# Patient Record
Sex: FEMALE | Race: WHITE | NOT HISPANIC OR LATINO | Employment: OTHER | URBAN - METROPOLITAN AREA
[De-identification: names, ages, dates, MRNs, and addresses within clinical notes are randomized per-mention and may not be internally consistent; named-entity substitution may affect disease eponyms.]

---

## 2023-11-01 ENCOUNTER — OFFICE VISIT (OUTPATIENT)
Dept: NEUROLOGY | Facility: CLINIC | Age: 68
End: 2023-11-01
Payer: MEDICARE

## 2023-11-01 VITALS — HEART RATE: 84 BPM | SYSTOLIC BLOOD PRESSURE: 132 MMHG | DIASTOLIC BLOOD PRESSURE: 88 MMHG

## 2023-11-01 DIAGNOSIS — G47.33 OBSTRUCTIVE SLEEP APNEA: Primary | ICD-10-CM

## 2023-11-01 DIAGNOSIS — G47.33 OBSTRUCTIVE SLEEP APNEA: ICD-10-CM

## 2023-11-01 DIAGNOSIS — M54.2 NECK PAIN: Primary | ICD-10-CM

## 2023-11-01 PROCEDURE — 99215 OFFICE O/P EST HI 40 MIN: CPT | Performed by: PSYCHIATRY & NEUROLOGY

## 2023-11-01 RX ORDER — PRAVASTATIN SODIUM 20 MG/1
20 TABLET ORAL
COMMUNITY
Start: 2023-06-08

## 2023-11-01 RX ORDER — PANTOPRAZOLE SODIUM 40 MG/1
40 TABLET, DELAYED RELEASE ORAL DAILY
COMMUNITY

## 2023-11-01 RX ORDER — BIOTIN 10 MG
TABLET ORAL
COMMUNITY
Start: 2021-07-09

## 2023-11-01 RX ORDER — METHYLPREDNISOLONE 4 MG/1
TABLET ORAL
Qty: 21 TABLET | Refills: 0 | Status: SHIPPED | OUTPATIENT
Start: 2023-11-01 | End: 2023-11-08

## 2023-11-01 RX ORDER — ONDANSETRON 4 MG/1
1 TABLET, ORALLY DISINTEGRATING ORAL EVERY 6 HOURS PRN
COMMUNITY
Start: 2023-03-06

## 2023-11-01 RX ORDER — DICYCLOMINE HYDROCHLORIDE 20 MG/1
1 TABLET ORAL DAILY PRN
COMMUNITY
Start: 2023-06-08

## 2023-11-01 RX ORDER — CITALOPRAM 40 MG/1
20 TABLET, FILM COATED ORAL
COMMUNITY
Start: 2015-12-09

## 2023-11-01 RX ORDER — ACETAMINOPHEN, DEXTROMETHORPHAN HBR, DOXYLAMINE SUCCINATE, PHENYLEPHRINE HCL 650; 20; 12.5; 1 MG/30ML; MG/30ML; MG/30ML; MG/30ML
SOLUTION ORAL
COMMUNITY
Start: 2021-07-09

## 2023-11-01 RX ORDER — ACETAMINOPHEN 500 MG
TABLET ORAL
COMMUNITY
Start: 2021-04-09

## 2023-11-01 RX ORDER — LORAZEPAM 0.5 MG/1
0.5 TABLET ORAL NIGHTLY PRN
COMMUNITY

## 2023-11-01 RX ORDER — FLUTICASONE PROPIONATE 50 MCG
1 SPRAY, SUSPENSION (ML) NASAL
COMMUNITY
Start: 2023-06-08

## 2023-11-01 RX ORDER — TIZANIDINE 2 MG/1
2 TABLET ORAL AS NEEDED
COMMUNITY
Start: 2023-08-22

## 2023-11-01 RX ORDER — BIOTIN 10 MG
TABLET ORAL EVERY 24 HOURS
COMMUNITY
End: 2023-11-04 | Stop reason: SDUPTHER

## 2023-11-01 NOTE — PROGRESS NOTES
Chief complaint:        History of Present Illness:       There were no vitals taken for this visit.     Physical examination:      Neurologic Exam       No diagnosis found.       No orders of the defined types were placed in this encounter.         Impression and Plan:      Alissa Osman MD

## 2023-11-01 NOTE — PROGRESS NOTES
Chief complaint:        History of Present Illness:   Florina feels terrible. She is tired and sleeps 16 hours a day. She continues to feel like there is pressure in her head. Her ears feel under pressure and blocked. Her hearing may be muffled. She has seen ENT multiple times and they concluded that she may have Eustachian tube problems. She hears swishing of her blood in her head.     When she walks in a store she may feel woozy and her vision may start blacking out, and she has to lean on the wall. Her cardiologist said that her blood pressure is fine. He ordered a carotid ultrasound, but not for several months.     She had  a sleep study at [facility] and does not think she slept much. She was diagnosed with obstructive sleep apnea and started CPAP. She has been using it but is not sure it makes her feel any better. She thinks it may make her head hurt more by morning.       Physical examination:  She is a pleasant, healthy-appearing woman. Her neck was supple with tenderness on the left side.  Neurologic Exam     Mental Status   Her speech was clear, fluent and appropriate.      Cranial Nerves     CN II   Visual fields full to confrontation.     CN III, IV, VI   Extraocular motions are normal.     CN V   Facial sensation intact.     CN VII   Facial expression full, symmetric.     Motor Exam   Overall muscle tone: normal    Strength   Right deltoid: 5/5  Left deltoid: 5/5  Right biceps: 5/5  Right iliopsoas: 5/5  Left iliopsoas: 5/5    Sensory Exam   Light touch normal.     Gait, Coordination, and Reflexes     Reflexes   Right brachioradialis: 2+  Left brachioradialis: 2+  Right biceps: 2+  Left biceps: 2+  Right triceps: 2+  Left triceps: 2+  Right patellar: 2+  Left patellar: 2+  Right achilles: 1+  Left achilles: 2+  Right plantar: normal  Left plantar: normal  Her gait was narrow-based with a hint of circumduction of the right leg. She could walk on her heels or toes and perform a tandem gait  cautiously.          No diagnosis found.     1. Neck pain  methylPREDNISolone (Medrol Dospak) 4 mg tablets      2. Obstructive sleep apnea           No orders of the defined types were placed in this encounter.         Impression and Plan:    Florina bacon to have a constant feeling of pressure in her head and ears, possibly associated with chronic neck pain, as well as fatigue despite sleeping 16 hours a day. She has tried using CPAP for obstructive sleep apnea with unclear benefit. We will have her consult a neurologist who specializes in sleep disorders. She will also take a Medrol dosepak for her neck pain. She will call  in a week with an update after taking Medrol. Follow up will then be determined.   Alissa Osman MD

## 2023-11-01 NOTE — PATIENT INSTRUCTIONS
You continue to have a feeling of constant pressure in your head and ears, neck pain and fatigue. You have had trouble using CPAP so we will have you consult a neurologist who specializes in sleep disorders. Please also take a Medrol dosepak, taking all pills for the day in the morning. Call me next week and let me know if it helped. You can stop it if you get major side effects and let me know. Please also call Dr. Villegas's office and see if you can get a carotid ultrasound earlier. Call me if you are not able to move up the date. We will then decide about further evaluation.

## 2023-11-04 PROBLEM — R73.09 ELEVATED GLUCOSE: Status: ACTIVE | Noted: 2018-04-15

## 2023-11-04 PROBLEM — F41.9 ANXIETY: Status: ACTIVE | Noted: 2023-11-04

## 2023-11-04 PROBLEM — D35.2 PROLACTINOMA (MULTI): Status: ACTIVE | Noted: 2023-11-04

## 2023-11-04 PROBLEM — G43.109 CLASSICAL MIGRAINE WITHOUT INTRACTABLE MIGRAINE: Status: ACTIVE | Noted: 2023-11-04

## 2023-11-04 PROBLEM — R51.9 INTRACTABLE HEADACHE: Status: ACTIVE | Noted: 2023-11-04

## 2023-11-04 PROBLEM — D49.7 PITUITARY TUMOR: Status: ACTIVE | Noted: 2023-11-04

## 2023-11-04 PROBLEM — C54.1: Status: ACTIVE | Noted: 2023-11-04

## 2023-11-04 PROBLEM — K92.1 HEMATOCHEZIA: Status: ACTIVE | Noted: 2023-11-04

## 2023-11-04 PROBLEM — H93.A1 SUBJECTIVE PULSATILE TINNITUS OF RIGHT EAR: Status: ACTIVE | Noted: 2023-11-04

## 2023-11-04 PROBLEM — Z86.0100 HISTORY OF COLON POLYPS: Status: ACTIVE | Noted: 2023-11-04

## 2023-11-04 PROBLEM — E88.819 INSULIN RESISTANCE: Status: ACTIVE | Noted: 2023-11-04

## 2023-11-04 PROBLEM — E55.9 VITAMIN D DEFICIENCY: Status: ACTIVE | Noted: 2023-11-04

## 2023-11-04 PROBLEM — N95.0 POSTMENOPAUSAL BLEEDING: Status: ACTIVE | Noted: 2023-11-04

## 2023-11-04 PROBLEM — H60.90 OTITIS EXTERNA: Status: ACTIVE | Noted: 2023-11-04

## 2023-11-04 PROBLEM — R51.9 RIGHT-SIDED HEADACHE: Status: ACTIVE | Noted: 2023-11-04

## 2023-11-04 PROBLEM — G47.30 SLEEP APNEA: Status: ACTIVE | Noted: 2023-11-04

## 2023-11-04 PROBLEM — M47.812 CERVICAL ARTHRITIS: Status: ACTIVE | Noted: 2023-09-19

## 2023-11-04 PROBLEM — G47.19 EXCESSIVE DAYTIME SLEEPINESS: Status: ACTIVE | Noted: 2023-11-04

## 2023-11-04 PROBLEM — K21.00 GERD WITH ESOPHAGITIS: Status: ACTIVE | Noted: 2023-11-04

## 2023-11-04 PROBLEM — M54.16 LUMBAR RADICULOPATHY: Status: ACTIVE | Noted: 2023-11-04

## 2023-11-04 PROBLEM — E78.5 HYPERLIPIDEMIA: Status: ACTIVE | Noted: 2018-04-15

## 2023-11-04 PROBLEM — Z86.010 HISTORY OF COLON POLYPS: Status: ACTIVE | Noted: 2023-11-04

## 2023-11-04 PROBLEM — K22.70 BARRETT'S ESOPHAGUS WITHOUT DYSPLASIA: Status: ACTIVE | Noted: 2018-04-15

## 2023-11-04 PROBLEM — H90.3 SENSORINEURAL HEARING LOSS (SNHL) OF BOTH EARS: Status: ACTIVE | Noted: 2023-11-04

## 2023-11-04 PROBLEM — M85.80 OSTEOPENIA: Status: ACTIVE | Noted: 2023-11-04

## 2023-11-04 PROBLEM — K58.0 IRRITABLE BOWEL SYNDROME WITH DIARRHEA: Status: ACTIVE | Noted: 2023-11-04

## 2023-11-04 PROBLEM — J30.9 ALLERGIC RHINITIS: Status: ACTIVE | Noted: 2023-11-04

## 2023-11-04 PROBLEM — R79.89 ABNORMAL C-REACTIVE PROTEIN: Status: ACTIVE | Noted: 2023-11-04

## 2023-11-04 PROBLEM — E53.8 VITAMIN B12 DEFICIENCY: Status: ACTIVE | Noted: 2021-11-04

## 2023-11-04 PROBLEM — C55 UTERINE CANCER (MULTI): Status: ACTIVE | Noted: 2023-11-04

## 2023-11-04 PROBLEM — R00.2 PALPITATIONS: Status: ACTIVE | Noted: 2023-11-04

## 2023-11-04 PROBLEM — G93.32 CHRONIC FATIGUE SYNDROME: Status: ACTIVE | Noted: 2023-11-04

## 2023-11-04 PROBLEM — N95.1 FEMALE CLIMACTERIC STATE: Status: ACTIVE | Noted: 2023-11-04

## 2023-11-04 PROBLEM — K76.0 HEPATIC STEATOSIS: Status: ACTIVE | Noted: 2023-11-04

## 2023-11-04 PROBLEM — G44.229 CHRONIC TENSION-TYPE HEADACHE, NOT INTRACTABLE: Status: ACTIVE | Noted: 2023-11-04

## 2023-11-04 RX ORDER — TRAZODONE HYDROCHLORIDE 50 MG/1
50 TABLET ORAL AS NEEDED
COMMUNITY

## 2023-11-06 ENCOUNTER — OFFICE VISIT (OUTPATIENT)
Dept: SLEEP MEDICINE | Facility: CLINIC | Age: 68
End: 2023-11-06
Payer: MEDICARE

## 2023-11-06 VITALS
HEIGHT: 62 IN | BODY MASS INDEX: 29.08 KG/M2 | WEIGHT: 158 LBS | DIASTOLIC BLOOD PRESSURE: 78 MMHG | SYSTOLIC BLOOD PRESSURE: 138 MMHG | HEART RATE: 96 BPM | OXYGEN SATURATION: 96 %

## 2023-11-06 DIAGNOSIS — J30.9 ALLERGIC RHINITIS, UNSPECIFIED SEASONALITY, UNSPECIFIED TRIGGER: ICD-10-CM

## 2023-11-06 DIAGNOSIS — G47.33 OSA (OBSTRUCTIVE SLEEP APNEA): Primary | ICD-10-CM

## 2023-11-06 DIAGNOSIS — G47.19 EXCESSIVE DAYTIME SLEEPINESS: ICD-10-CM

## 2023-11-06 DIAGNOSIS — G25.81 RLS (RESTLESS LEGS SYNDROME): ICD-10-CM

## 2023-11-06 PROCEDURE — 1159F MED LIST DOCD IN RCRD: CPT | Performed by: PSYCHIATRY & NEUROLOGY

## 2023-11-06 PROCEDURE — 1160F RVW MEDS BY RX/DR IN RCRD: CPT | Performed by: PSYCHIATRY & NEUROLOGY

## 2023-11-06 PROCEDURE — 99205 OFFICE O/P NEW HI 60 MIN: CPT | Performed by: PSYCHIATRY & NEUROLOGY

## 2023-11-06 PROCEDURE — 1036F TOBACCO NON-USER: CPT | Performed by: PSYCHIATRY & NEUROLOGY

## 2023-11-06 RX ORDER — AZELASTINE 1 MG/ML
1 SPRAY, METERED NASAL 2 TIMES DAILY
Qty: 30 ML | Refills: 1 | Status: SHIPPED | OUTPATIENT
Start: 2023-11-06 | End: 2024-11-05

## 2023-11-06 NOTE — PROGRESS NOTES
Patient: Florina Miramontes    [id number]  : 1955 -- AGE 68 y.o.    Provider: Alvarado Velez MD     [facility]   Service Date: 2023              [facility]  New Visit Note        The patient's referring provider is: Dr. Alissa Osman    HPI: Florina Miramontes is a 68 y.o. female with NELDA on CPAP with PMH notable for allergic rhinitis, HLD, palpitations, insulin resistance, prolactinoma, vitamin B deficiency, vitamin D deficiency, GERD with esophagitis, IBS, uterine cancer, possible eustachian tube dysfunction, chronic tension-type headache, migraine, chronic fatigue syndrome, and excessive daytime sleepiness, who presents today as a referral from her neurologist for evaluation of her excessive daytime sleepiness and non-restorative sleep despite CPAP.     Patient is accompanied today by her .    Patient's main complaint is difficulty waking up about how long she has slept and experiencing excessive daytime sleepiness. For 8-9 years she has felt excessively sleepy and that her sleep is non-restorative.     She endorses occasional snoring that is moderate in severity, no witnessed apneas or waking up gasping/choking.  She does not have night sweats nor morning sore throat, but has nocturia, nocturnal heartburn, bruxism, morning dry mouth, and morning headaches.      She has experienced dreaming as she falls asleep.  Denies sleep paralysis, sleep-related hallucinations, cataplexy, sleepwalking, and dream enactment behavior.      Started on CPAP in early October. Did not want to try CPAP before this.    Has an issue with her ears feeling full and blocked. When using CPAP she wakes with her head pounding and her head feeling full, which prevents her from being fully compliant with CPAP. Used an under the nose style nasal mask that was comfortable enough, did not like nasal pillows due to nasal irritation. Once turned on humidity function and felt she was  "drowning in water in her sleep.    Feels she is dreaming all night long, but rarely when napping.    RLS - bothersome urge to move her legs at night, relieved with ambulation, occurs about once/month now, was more frequent as a younger adult. Symptoms can affect her ability to fall asleep.    Also endorses head pressure/throbbing/lightheadedness and neck stiffness.    Has years of allergic rhinitis due to environmental factors, including her 10 year old dog. Sometimes uses her Flonase, but it does not help much. Further questioning yielded the information that she is spraying it directly up her nostrils and swallowing most of it rather than spraying it towards the lateral walls of her nostrils. Thinks she may have azelastine spray at home, but requests a prescription just in case.    DAYTIME SYMPTOMS  [place]: 9 (1, 1, 2, 2, 3, 0, 0, 0)  Daytime sleepiness: Frequent - \"slept most of the summer away\" and her mood was low  Fatigue: Yes  Trouble with memory/concentration: Yes  Dozing: no  Feeling sleepy while driving: no    Insomnia severity index: 14/28  FOSQ: 15    SLEEP HABITS:   Preferred sleep position: lateral  Trazodone 50 mg - tried it twice - did not help  Lorazepam 0.5 mg - taking half a tab about twice per month to help her sleep, wakes up feeling better  Bedtime: 8 pm, sleep latency 2 hours  Wake time: 7 am  Napping: around 1 or 2 pm for 2-3 hours almost daily. Napping is not refreshing  Total estimated sleep per 24 hrs: 12 hours    PRIOR SLEEP STUDIES:  HST through  on 8/16/2021, weight 152 pounds, BMI 27.8: Performed due to patient's complaints of snoring, excessive daytime sleepiness, and waking up coughing/clearing throat.  Study estimated sleep efficiency of 94.3% and showed mild-moderate NELDA.  AHI3% 17/h, AHI4% 6.5/h, mean SpO2 93.1%, tru 83.9%, 0.1 minutes spent at or below 88%.    She also reportedly had a sleep study done at [place] through [facility] in 2022. Patient states she slept " for about 1.5 hours, did not fall asleep until 4 AM, was told she has moderate to severe NELDA, and that she had 22 arousals/h. Patient states that she was told that she did not any PLMS.        Patient Active Problem List   Diagnosis    Anxiety    Allergic rhinitis    Aguayo's esophagus without dysplasia    Chronic tension-type headache, not intractable    Cervical arthritis    Classical migraine without intractable migraine    Elevated glucose    Endometrial/uterine adenocarcinoma (CMS/HCC)    Excessive daytime sleepiness    Chronic fatigue syndrome    Female climacteric state    GERD with esophagitis    Hematochezia    Hepatic steatosis    History of colon polyps    Hyperlipidemia    Insulin resistance    Irritable bowel syndrome with diarrhea    Intractable headache    Lumbar radiculopathy    Neck pain    Osteopenia    Palpitations    Otitis externa    Pituitary tumor    Postmenopausal bleeding    Prolactinoma (CMS/HCC)    Right-sided headache    Sleep apnea    Subjective pulsatile tinnitus of right ear    Uterine cancer (CMS/HCC)    Vitamin B12 deficiency    Vitamin D deficiency    Abnormal C-reactive protein    Sensorineural hearing loss (SNHL) of both ears     Past Medical History:   Diagnosis Date    Personal history of malignant neoplasm, unspecified     History of malignant neoplasm     Past Surgical History:   Procedure Laterality Date     SECTION, CLASSIC  2015     Section    MR HEAD ANGIO WO IV CONTRAST  2019    MR HEAD ANGIO WO IV CONTRAST 2019 [facility]    OTHER SURGICAL HISTORY  10/04/2022    Arm surgery    OTHER SURGICAL HISTORY  2019    Hysterectomy    SMALL INTESTINE SURGERY  2015    Small Bowel Resection     Current Outpatient Medications   Medication Sig Dispense Refill    ascorbic acid (Vitamin C) 100 mg tablet       biotin 10 mg tablet Take by mouth.      cholecalciferol (Vitamin D-3) 50 mcg (2,000 unit) capsule Take by mouth.       citalopram (CeleXA) 40 mg tablet Take 0.5 tablets (20 mg) by mouth once daily.      cyanocobalamin, vitamin B-12, (Vitamin B-12) 1,000 mcg tablet extended release Take by mouth.      dicyclomine (Bentyl) 20 mg tablet Take 1 tablet (20 mg) by mouth once daily as needed.      fluticasone (Flonase) 50 mcg/actuation nasal spray 1 spray by Does not apply route once daily.      LORazepam (Ativan) 0.5 mg tablet Take 1 tablet (0.5 mg) by mouth as needed at bedtime.      methylPREDNISolone (Medrol Dospak) 4 mg tablets Follow schedule on package instructions 21 tablet 0    ondansetron ODT (Zofran-ODT) 4 mg disintegrating tablet Take 1 tablet (4 mg) by mouth every 6 hours if needed.      pantoprazole (ProtoNix) 40 mg EC tablet Take 1 tablet (40 mg) by mouth once daily.      pravastatin (Pravachol) 20 mg tablet Take 1 tablet (20 mg) by mouth once daily.      tiZANidine (Zanaflex) 2 mg tablet Take 1 tablet (2 mg) by mouth every 8 hours if needed.      traZODone (Desyrel) 50 mg tablet Take 1 tablet (50 mg) by mouth once daily at bedtime.       No current facility-administered medications for this visit.     Allergies   Allergen Reactions    Allerg Xt,Grass Pollen-Luigi Unknown    Cat Dander Other    Cockroach Unknown    Dog Dander Other    Feathers Unknown     Feather/down    Grass Pollen-[place], Standard Unknown    House Dust Mite Unknown    Mold Unknown    Pollen Extracts Unknown    Ragweed Pollen Unknown    Simvastatin Other    Tree And Shrub Pollen Unknown       FAMILY HISTORY OF SLEEP DISORDERS: mother was excessively sleepy    Family History   Problem Relation Name Age of Onset    Hypertension Mother      Hypothyroidism Mother      Diabetes type II Mother      Hypertension Father      Peripheral vascular disease Father      Diabetes type II Father         SOCIAL HISTORY  Employment: retired - when she was working she would doze off at the computer  Lives with:   Alcohol: infrequent  Cigarettes: never  Illicits:  "none  Caffeine: 3 servings/day     ROS: The detailed review of symptoms sheet filled by the patient was reviewed today. This is scanned in her electronic record and the reader is referred to that for further details. 12 point ROS positive for fatigue, dry eyes, hearing loss, ringing in her ears, postnasal drip, heartburn at night, nausea, occasional vomiting (has IBS), occasional memory concerns, headaches, joint pain that affects her sleep, muscle pain/cramps, and anxiety. All other items/systems were reviewed and are negative.    PHYSICAL EXAMINATION:   Vitals:    11/06/23 0939   BP: 138/78   BP Location: Left arm   Patient Position: Sitting   BP Cuff Size: Large adult   Pulse: 96   SpO2: 96%   Weight: 71.7 kg (158 lb)   Height: 1.58 m (5' 2.21\")     Body mass index is 28.71 kg/m².  General: Awake. Alert. Comfortable. No apparent distress.   Speech: Normal  Comprehension: Normal  Mood: Stable  Affect: Appropriate  Eyes:   Eyelids: normal            ENT:          Right nasal passageway more than left is congested. Septum deviation absent. Schmid tongue position class III. Tongue scalloping  present, tongue is mildly enlarged, soft palate is elongated, hard palate is not high arched. Uvula not enlarged. Retrognathia not present. Tonsils are surgically absent. Dentition good.           Neck:          Circumference: 15\"  Cardiac: Regular in rate and rhythm. No murmurs.  No edema in bilateral lower extremities.  Pul:         Clear to auscultation bilaterally. Normal respiratory effort   Abd:         increased central adiposity  Neuro: Alert, well-oriented. Cranial nerves II-XII grossly normal and symmetric.  Moves all limbs symmetrically with no evidence of significant focal weakness. No abnormal movements noted. Normal gait      CPAP download:  DME: [facility] home care  Setup date: early October, 2023 per pt  Date range: last 30 days  Days used: 22/30  Days used >4 hours: 15/30 (50%)  Average usage (days " used): 5 h 14 min  Settin-20 cm H2O  Pressure: 95th %ile 6.0 cm H2O, mean 5 cm H2O  Leak: 5.5 L/min (average)  AHI: 0.3      LABS/DIAGNOSTICS:  Lab Results   Component Value Date    HGB 12.7 2023    CO2 23 (L) 2021    TSH 1.69 2021    TSH 2.89 2019    HGBA1C 5.9 (H) 2023    [id number] 2021        Echo 3/2014: per report - LVEF 60-65%. Slight E to A reversal, probably due to an increase of left ventricle end diastolic pressure. Trivial tricuspid regurgitation. TR peak velocity is 226 cm/s RVSP is 30 mmHg     MRI brain 2019: Per report - There are minimal punctate areas of hyperintense FLAIR signal in bilateral periventricular white matter, likely reflecting sequela of small vessel ischemic disease. A tiny focus of remote lacunar infarct is noted within the lateral aspect of left thalamus/adjacent internal capsule region.    MRA brain 2019: no significant abnormalities        ASSESSMENT AND PLAN: Ms. Florina Miramontes is a 68 y.o. female with a history of NELDA who was started on CPAP in the last month who has met the 4 hour compliance requirement just 50% of the days, who has a long history of significant sleepiness and fatigue. Her sleepiness and fatigue are likely primarily due to years of untreated sleep apnea with possible/probable contribution from mood disorder (anxiety) and headaches and other medical conditions; low suspicion for a central disorder of hypersomnolence, such as narcolepsy or idiopathic hypersomnia. Several more months of NELDA treatment are needed before it would be appropriate to consider testing for narcolepsy/IH, and in order to get an optimal/valid mean sleep latency test (MSLT), she would ideally be off of her REM-suppressing medication, including her citalopram, for at least 2 weeks.    #NELDA  -CPAP nightly. Advised pt to continue trying to use it as best as she can because she will likely desensitize further and tolerate it more with practice.  Reviewed the compliance requirement per her insurance company over the first 90 days of having CPAP  -We discussed the risk factors for sleep apnea, pathophysiology of sleep apnea, treatment options, and potential long-term complications of untreated NELDA, including cardiovascular and metabolic complications.   -open to nasal EPAP device if CPAP fails  -will try to get a copy of her 2022 [facility] sleep study    #allergic rhinitis  -instructed patient on proper use of nasal sprays  -continue daily Flonase  -Rx for nasal azelastine spray 1 spray per nostril BID    #RLS - infrequent, but bothersome when it occurs  -pt defers having me order blood tests and asks for a list of blood tests to provide to her [facility] PCP, whom she will be seeing in the coming weeks  -PCP to consider ordering fasting iron/TIBC and ferritin, as well as vit D, vit B12, TSH    #excessive daytime sleepiness  -plan as above, PCP to consider the above labs      Over 65 minutes were spent on this encounter, including time reviewing the chart, conducting the H&P, counseling the patient, placing order(s), and documenting.      FOLLOW UP:   2-3 months

## 2023-11-06 NOTE — PATIENT INSTRUCTIONS
Aim to use CPAP every night for all night, at least 4 hours to satisfy insurance requirements.    Talk to your PCP about ordering iron, ferritin, vitamin B12, vitamin D, and TSH. The iron/ferritin are also restless leg syndrome labs.    Use your Flonase daily, consider also using your antihistamine spray (azelastine 1-2 sprays per nostril). Wait about an hour before using CPAP after using the sprays. Aim the sprays towards your ears.

## 2023-12-15 ENCOUNTER — APPOINTMENT (OUTPATIENT)
Dept: NEUROLOGY | Facility: CLINIC | Age: 68
End: 2023-12-15
Payer: MEDICARE

## 2024-01-04 ENCOUNTER — APPOINTMENT (OUTPATIENT)
Dept: SLEEP MEDICINE | Facility: CLINIC | Age: 69
End: 2024-01-04
Payer: MEDICARE

## 2024-02-20 ENCOUNTER — OFFICE VISIT (OUTPATIENT)
Dept: GASTROENTEROLOGY | Facility: CLINIC | Age: 69
End: 2024-02-20
Payer: MEDICARE

## 2024-02-20 VITALS
DIASTOLIC BLOOD PRESSURE: 82 MMHG | HEIGHT: 62 IN | BODY MASS INDEX: 27.68 KG/M2 | HEART RATE: 83 BPM | OXYGEN SATURATION: 99 % | WEIGHT: 150.4 LBS | TEMPERATURE: 97.7 F | SYSTOLIC BLOOD PRESSURE: 130 MMHG | RESPIRATION RATE: 15 BRPM

## 2024-02-20 DIAGNOSIS — R11.2 NAUSEA AND VOMITING, UNSPECIFIED VOMITING TYPE: ICD-10-CM

## 2024-02-20 DIAGNOSIS — K58.0 IRRITABLE BOWEL SYNDROME WITH DIARRHEA: ICD-10-CM

## 2024-02-20 DIAGNOSIS — R10.30 LOWER ABDOMINAL PAIN: ICD-10-CM

## 2024-02-20 DIAGNOSIS — K58.0 IRRITABLE BOWEL SYNDROME WITH DIARRHEA: Primary | ICD-10-CM

## 2024-02-20 PROBLEM — K92.1 HEMATOCHEZIA: Status: RESOLVED | Noted: 2023-11-04 | Resolved: 2024-02-20

## 2024-02-20 PROCEDURE — 1126F AMNT PAIN NOTED NONE PRSNT: CPT

## 2024-02-20 PROCEDURE — 1160F RVW MEDS BY RX/DR IN RCRD: CPT

## 2024-02-20 PROCEDURE — 99205 OFFICE O/P NEW HI 60 MIN: CPT

## 2024-02-20 PROCEDURE — 1159F MED LIST DOCD IN RCRD: CPT

## 2024-02-20 PROCEDURE — 1036F TOBACCO NON-USER: CPT

## 2024-02-20 RX ORDER — HYOSCYAMINE SULFATE 0.38 MG/1
0.38 TABLET, EXTENDED RELEASE ORAL DAILY
Qty: 30 TABLET | Refills: 0 | Status: SHIPPED | OUTPATIENT
Start: 2024-02-20 | End: 2024-02-21 | Stop reason: SDUPTHER

## 2024-02-20 RX ORDER — BISMUTH SUBSALICYLATE 262 MG
1 TABLET,CHEWABLE ORAL DAILY
COMMUNITY

## 2024-02-20 RX ORDER — FAMOTIDINE 20 MG/1
20 TABLET, FILM COATED ORAL 2 TIMES DAILY PRN
Qty: 60 TABLET | Refills: 0 | Status: SHIPPED | OUTPATIENT
Start: 2024-02-20 | End: 2024-02-21 | Stop reason: SDUPTHER

## 2024-02-20 ASSESSMENT — ENCOUNTER SYMPTOMS
OCCASIONAL FEELINGS OF UNSTEADINESS: 0
LOSS OF SENSATION IN FEET: 0
DEPRESSION: 0

## 2024-02-20 ASSESSMENT — PAIN SCALES - GENERAL: PAINLEVEL: 0-NO PAIN

## 2024-02-20 NOTE — PATIENT INSTRUCTIONS
Fiber caps- take 2 caps daily x1 week, then 3 caps daily. Can titrate weekly up to 5 caps per day  Start hyoscyamine daily  Do not eat/snack 3 hours prior to laying down and going to bed  Use famotidine (Pepcid) twice daily as needed for nausea and indigestion  Consider cutting out gluten vs low FODMAP diet     FIBER  Fiber is a type of carbohydrate. Carbohydrates are normally converted by the body into sugar, but fiber passes through the body undigested. This might sound bad, but it is actually good!     Fiber, along with adequate fluid intake, keeps your digestive tract running smoothly. Fiber helps GI issues like diverticular disease, hemorrhoids, and constipation. It also appears to lower the risk of developing various conditions, including heart disease, diabetes, high blood pressure, obesity, stroke, and colon cancer. A high-fiber diet also keeps you feeling cuello longer, which is linked to lower body weight.     There are two types of fiber: soluble and insoluble.   Soluble fiber helps ease the movement of waste in your digestive tract by binding to water, creating a gel that helps to soften and bulk stool.    Insoluble fiber stimulates the secretion of water and mucus in the bowel to encourage movement of stool.     Try to eat 25-38g of fiber daily. Estimates say only 5 percent of Americans meet their daily fiber requirements. It is important to get both types of fiber in your diet.    When you increase fiber, drink more water so the fiber can work properly and do it slowly over a few weeks. Bloating and discomfort may occur or get worse before it improves while the body is adjusting over the first few weeks after increasing fiber intake.    Add the fiber supplement psyllium (brands- Metamucil, [place], store brand) daily. Start with one teaspoon per day for one week, then increase to two teaspoons per day the next week, then increase to one tablespoon daily.  Mix fiber in at least 8 ounces of water or  another uncarbonated beverage. Psyllium is a soluble fiber, meaning it becomes gelatinous in water. Drink mixture right away to avoid a thick texture.     Here is some more information about fiber and foods that are high in fiber  [facility] Nutrition for Gut Health- fiber  [email or web address]

## 2024-02-20 NOTE — PROGRESS NOTES
History Of Present Illness  Florina Miramontes is a 68 y.o. female presenting with a chief complaint of New Patient Visit. She has multiple GI complaints (nausea, vomiting, abdominal pain, fecal urgency, and diarrhea) but her biggest issue is vomiting.  She has seen multiple providers for this over the last 4 years. She last saw Dr. Jay who did an endoscopy, she thinks it was negative. Most of patient's symptoms started after chemo & radiation back in 2019/2020.  She has     Patient has nausea and vomiting every 2 weeks.  She will wake up in the middle of the night severely nauseous and then will vomit for many hours.  Denies hematemesis, symptoms only seem to occur between midnight and 5 AM. Tried tums and Pepto- helped the nausea, notes that Zofran did not help nausea at all.  She admits to late night snacking and eats while in bed almost nightly. At times she may throw up for 5 hours straight, until all the food comes back up.     Pt also complains of cramping, pain, gas, and diarrhea. Gets LLQ cramping in the am that gets better with bm.  Sometimes she will be sick all day with the sensation of her guts burning and chills- normally lasts aout a day.  She doesn't go anywhere dure to fecal urgency.  Patient has foul smelling stools with mucous at times, denies BRBPR, hematochezia. Usually has one bm in the am. When she has episodes of abdominal pain with diarrhea, will have 4-5 bms at a time. Bowel movements are all over the place, normally BSS 4 but can be pure liquid when she has episodes of abdominal cramping and diarrhea.  She has tried dicyclomine for symptoms which helps.     Her last colonoscopy was 2021 due to blood in stools. No blood noted since but at times stool has mucus in stool.    Tried food diary. She is lactose intolerant. She has never cut out gluten or completed a FODMAP diet to identify food triggers. She has never completed stool studies    Social History  She reports that she has never  "smoked. She has never used smokeless tobacco. She reports that she does not currently use alcohol. She reports that she does not use drugs.  She does not take NSAIDs on a regular basis    Family History  Family History   Problem Relation Name Age of Onset    Hypertension Mother      Hypothyroidism Mother      Diabetes type II Mother      Hypertension Father      Peripheral vascular disease Father      Diabetes type II Father      Heart disease Father's Sister      Heart disease Father's Brother      Colon cancer Paternal Grandfather      Breast cancer Paternal Grandmother      Bone cancer Paternal Grandmother       The patient does have a FH of CRC. she does not have a FH of IBD    Review of Systems      Physical Exam     Last Vital Signs  /82 (BP Location: Right arm, Patient Position: Sitting, BP Cuff Size: Adult)   Pulse 83   Temp 36.5 °C (97.7 °F) (Temporal)   Resp 15   Ht 1.562 m (5' 1.5\")   Wt 68.2 kg (150 lb 6.4 oz)   LMP  (LMP Unknown)   SpO2 99%   BMI 27.96 kg/m²      Relevant Results  Lab Results   Component Value Date    WBC 6.1 03/08/2023    HGB 12.7 03/08/2023    HCT 37.5 03/08/2023    MCV 88.0 03/08/2023     03/08/2023      Lab Results   Component Value Date    GLUCOSE 106 (H) 06/22/2021    CALCIUM 9.7 06/22/2021     06/22/2021    K 4.2 06/22/2021    CO2 23 (L) 06/22/2021     06/22/2021    BUN 11 06/22/2021    CREATININE 0.8 06/22/2021      Lab Results   Component Value Date    ALT 18 03/14/2021    AST 19 03/14/2021    ALKPHOS 60 03/14/2021    BILITOT 0.2 03/14/2021    BILIDIR 0.2 02/28/2020    INR 0.9 07/08/2019      Lab Results   Component Value Date    KSIMGQTE33 547 07/27/2021   Small bowel follow-through 3/13/2023- IMPRESSION:  1. Mildly decreased primary peristalsis with approximately 30% of the  ingested boluses remaining at the gastroesophageal junction and requiring several additional swallows to propel into the stomach. Findings could be seen with " presbyesophagus.  2. Gastroesophageal reflux is identified, and elicited with Valsalva  maneuver.  3. Patent gastroesophageal junction.  4. No sign of hiatal hernia.    CT abdomen pelvis with IV contrast 3/12/2021- IMPRESSION:  Wall thickening of the descending and transverse colon can be seen with colitis, infectious or inflammatory.     Hepatic steatosis  CT abdomen pelvis with IV contrast 6/15/2020 IMPRESSION:     No metastatic disease in the chest.     Healing nondisplaced left seventh rib fracture.     No metastatic disease in the abdomen or pelvis.     CT chest abdomen pelvis with IV contrast === 12/23/19 ===  - Impression -  1. No metastatic disease in the chest, abdomen or pelvis.  2. Diffuse hepatic steatosis.  3. Hysterectomy.  4. Diverticulosis of the colon. No diverticulitis.     EGD 5/3/2023 with Dr. Jay with Sharpe dilation-mildly irregular Z-line, Normal stomach, normal duodenum.  GE junction biopsy with squamous and nonspecialized columnar mucosa, no intestinal metaplasia present  Colonoscopy 2021- with Dr. Leach or Dr. Jain- negative per pt was told repeat in 5 years.     Colonoscopy 7/31/2020 with Dr. Pro-6 mm ascending colon polyp, few medium mouth diverticula in sigmoid colon, no rectal abnormalities.  Biopsies note tubular adenoma    EGD 7/31/2020 with Dr. Pro-mild chronic inactive gastritis, no H. pylori, negative GE junction biopsy    EGD 3/23/2017 with Dr. Jay-  GE junction biopsy with chronic inflammation, no intestinal metaplasia seen.      EGD 12/6/2013 with Dr. Jay-irregular Z-line, otherwise normal, GE junction biopsy with chronic inflammation and focal intestinal metaplasia-the extent of intestinal metaplasia is extremely limited    Colonoscopy 12/6/2012 with Dr. Jay-no polyps, mild diverticulosis sigmoid colon, red spot at 30 cm which might have been an incidental AVM, recommend 5-year follow-up    Assessment/Plan   68 y.o. female presenting to GI  clinic with multiple GI complaints.  Her biggest complaint is nausea and vomiting in the middle of the night, she will snack until she goes to bed and eats in bed, which I suspect is the cause of her symptoms.    She also has IBS with intermittent flares which was likely triggered with chemo/radiation and I suspect is exacerbated with anxiety.  Endorses irregular bowel habits and chronic abdominal pain and bloating with flares.  Has never tried FODMAP diet, cutting out gluten.  She has never completed stool studies, but does not have persistent diarrhea, so will hold on these.  She has fiber pills, but does not take them.  She has some relief of symptoms with dicyclomine.    There was focal intestinal metaplasia on an EGD in 2013, patient has had multiple EGDs since then without evidence of intestinal metaplasia, so it is likely that she does not have Aguayo's esophagus    Stop eating 3 hours prior to bedtime and do not lay down within 3 hours of last meal/snack  Start famotidine-take twice daily as needed, can take at bedtime and use second dose during day  Start fiber therapy daily  Continue dicyclomine as needed  Add Levbid daily  Follow-up in 2 months    Problem List Items Addressed This Visit       Irritable bowel syndrome with diarrhea    Relevant Medications    hyoscyamine ER (Levbid) 0.375 mg 12 hr tablet     Other Visit Diagnoses       Lower abdominal pain    -  Primary    Relevant Medications    hyoscyamine ER (Levbid) 0.375 mg 12 hr tablet    Nausea and vomiting, unspecified vomiting type        Relevant Medications    famotidine (Pepcid) 20 mg tablet            Emi Hicks, BRIAN-CNP

## 2024-02-21 RX ORDER — HYOSCYAMINE SULFATE 0.38 MG/1
TABLET, EXTENDED RELEASE ORAL
Qty: 90 TABLET | Refills: 0 | Status: SHIPPED | OUTPATIENT
Start: 2024-02-21 | End: 2024-05-21

## 2024-02-21 RX ORDER — FAMOTIDINE 20 MG/1
20 TABLET, FILM COATED ORAL 2 TIMES DAILY PRN
Qty: 180 TABLET | Refills: 0 | Status: SHIPPED | OUTPATIENT
Start: 2024-02-21 | End: 2024-05-21

## 2024-04-22 ENCOUNTER — APPOINTMENT (OUTPATIENT)
Dept: GASTROENTEROLOGY | Facility: CLINIC | Age: 69
End: 2024-04-22
Payer: MEDICARE

## 2024-09-06 ENCOUNTER — EVALUATION (OUTPATIENT)
Dept: PHYSICAL THERAPY | Facility: CLINIC | Age: 69
End: 2024-09-06
Payer: MEDICARE

## 2024-09-06 DIAGNOSIS — M54.2 CERVICALGIA: Primary | ICD-10-CM

## 2024-09-06 PROCEDURE — 97140 MANUAL THERAPY 1/> REGIONS: CPT | Mod: GP | Performed by: PHYSICAL THERAPIST

## 2024-09-06 PROCEDURE — 97162 PT EVAL MOD COMPLEX 30 MIN: CPT | Mod: GP | Performed by: PHYSICAL THERAPIST

## 2024-09-06 ASSESSMENT — ENCOUNTER SYMPTOMS
LOSS OF SENSATION IN FEET: 0
OCCASIONAL FEELINGS OF UNSTEADINESS: 1
DEPRESSION: 1

## 2024-09-06 NOTE — PROGRESS NOTES
Physical Therapy Evaluation and Treatment    Patient Name: Florina Miramontes  MRN: [id number]  Evaluation Date: 9/6/2024  Time Calculation  Start Time: 0920  Stop Time: 1000  Time Calculation (min): 40 min    INSURANCE:  Visit Number: 1  Approved Visits: [place]  Insurance Type: MEDICARE A/B - NO AUTH / [place] / $0 used 2024 PT/ST.  [employer]  SUPP ACTIVE     CMS Re-Certification Period:9/6/24 to 12/7/24    CURRENT PROBLEMS:   1. Cervicalgia  Referral to Physical Therapy    Follow Up In Physical Therapy          PRECAUTIONS:    PMH: (pertinent to PT evaluation)  CA, Hearing loss, osteopenia,  HA, chronic fatigue    *See [facility] EMR for complete list.      SPECIAL CONCERNS:   Pt states she has nutritional concernes in that she does not eat healthy  SUBJECTIVE  The pt gets HA and then light headed and feels like she could fall though she hasn't. (2-3 years but getting more often and more severe).   She also has come neck pain and poor mobility to the pont it locks.  The neck has been happening for years   She also has ear pressure on both sides.      She has had testing and will see a neurologist soon.  Dx with arthritis,  and disc involvement.  She gets wavy vision    No radicular symptoms.    PAIN:    Current  0 /10 pressure  RANGE: 0 /10  to  7  /10 quick episode  Location: entire head  Aggravating: looking down in car and then getting out of the car.  Neck is worse at night   Reducing: nothing - it happens quickly.  If bad lays down and extea strength Tylenol.   Sleep:  wakes up often - not due to pain    OUTCOME MEASURE  NDI  40 % impaired  ANGÉLICA  5 /14    HOME LIVING:  Lives with  in 1 story with a basement    PRIOR LEVEL OF FUNCTION  Same symptoms but not as often or severe    OBJECTIVE:      CERVCIAL   ROM:           MMT  Flexion  33  4/5  Extension  10  4/5  Side bend     R:    15      4/5  L :  17      4/5  Rotation     R:    25        L :  25             MMT:                                  R                   "L     Shoulder flexion                  5/5                 5/5  Shoulder abduction            5/5               5/5      SPECIAL TESTS:  Compression \"feels good\"  Distraction Reduced pain  Spurlings:        R    ( - )               L  (  -)  with both pull and not pain  Vertebral artery test     unable to test due to lack of ROM      POSTURE:  Forward head -1.5 inches  Shoulder:   Protraction ( B )  Elevation  (  B )    Occular motor   Saccades  H (-)      V (-)  Smooth pursuit   H (+)       V (+)   for blurred vision  Slow VOR    H (-)       V (+)   for blurred vision      TREATMENTS:  Manual Therapy:  10 minutes  STM to B UT/cervical in sitting  Seated gentle manual cervical distraction    Education:  Pt educated in:  + current status, general goals, treatment plan  + gentle neck ROM in all planes (needs pictures)  + pain/spasm cycle  + assessment    ASSESSMENT  Pt is a 69 y.o. Female who presents with impairments of neck and head  pain,  reduced strength, reduced ROM/flexibility, light headedness, and soft tissue restriction  These impairments have led to functional limitations including  difficulty with lifting, concentration, ballance/dizziness, driving, reading and recreation    Complexity level:  moderate    Rehab potential:  good    Pt would benefit from skilled physical therapy intervention to improve above impairments and facilitate return to function.    GOALS:  Pt stated goal:  More movement in her neck and loosen muscles in neck and shoudlers    Long term goals:  +   Reduce pain to < 3 /10  +  Increase cervical ROM by 20 degrees in all planes of motion as needed for ADL's and driving  + Improve strength in scapular stabilizers as needed for improved posture  +  the pt will be independent in self posture correction to place reduced stress on the cervcial spine  + Reduce soft tissue restriction for reduced pain  +  Abolish headaches  + The pt will be independent in a home exercise program for effective " self-management of symptoms  + abolish episodes of light headed/dizziness  + assess balance and achieve low fall risk         PLAN     Skilled physical therapy 2 times per week for 6-8 weeks  Treatment may include:  Therapeutic Exercise (28844)  Therapeutic Activity (30161)  Manual therapy (18753)  Neuro-muscular re-education (99839)        The pt agreed with above stated treatment plan and general goals.

## 2024-09-06 NOTE — LETTER
September 6, 2024    Chacha Maloney DO  [address] [facility] [address]    Patient: Florina Miramontes   YOB: 1955   Date of Visit: 9/6/2024       Dear Chacha Maloney DO  [address] [place] [address]    The attached plan of care is being sent to you because your patient’s medical reimbursement requires that you certify the plan of care. Your signature is required to allow uninterrupted insurance coverage.      You may indicate your approval by signing below and faxing this form back to us at Dept Fax: [phone].    Please call Dept: [phone] with any questions or concerns.    Thank you for this referral,        Margaret Duggan PT  [facility]  [address] [facility] [id number] [phone]    Payer: Payor: MEDICARE / Plan: MEDICARE PART A AND B / Product Type: *No Product type* /                                                                         Date:     Dear Margaret Duggan PT,     Re: Ms. Florina Miramontes, MRN:[id number]    I certify that I have reviewed the attached plan of care and it is medically necessary for Ms. Florina Miramontes (1955) who is under my care.          ______________________________________                    _________________  Provider name and credentials                                           Date and time                                                                                           Plan of Care 9/6/24   Effective from: 9/6/2024  Effective to: 12/5/2024    Plan ID: [id number]            Participants as of Finalize on 9/6/2024    Name Type Comments Contact Info    Chacha Maloney DO PCP - General  [phone]    Margaret Duggan PT Physical Therapist  [phone]       Last Plan Note     Author: Margaret Duggan PT Status: Incomplete Last edited: 9/6/2024  9:15 AM       Physical Therapy Evaluation and Treatment    Patient Name: Florina Miramontes  MRN: [id number]  Evaluation Date: 9/6/2024    "    INSURANCE:  Visit Number: 1  Approved Visits: [place]  Insurance Type: MEDICARE A/B - NO AUTH / [place] / $0 used 2024 PT/STCary [employer]  SUPP ACTIVE     CMS Re-Certification Period:9/6/24 to 12/7/24    CURRENT PROBLEMS:   No diagnosis found.    PRECAUTIONS:    PMH: (pertinent to PT evaluation)  CA, Hearing loss, osteopenia,  HA, chronic fatigue    *See [facility] EMR for complete list.      SPECIAL CONCERNS:   Pt states she has nutritional concernes in that she does not eat healthy  SUBJECTIVE  The pt gets HA and then light headed and feels like she could fall though she hasn't. (2-3 years but getting more often and more severe).   She also has come neck pain and poor mobility to the pont it locks.  The neck has been happening for years   She also has ear pressure on both sides.      She has had testing and will see a neurologist soon.  Dx with arthritis,  and disc involvement.  She gets wavy vision    No radicular symptoms.    PAIN:    Current  0 /10 pressure  RANGE: 0 /10  to  7  /10 quick episode  Location: entire head  Aggravating: looking down in car and then getting out of the car.  Neck is worse at night   Reducing: nothing - it happens quickly.  If bad lays down and extea strength Tylenol.   Sleep:  wakes up often - not due to pain    OUTCOME MEASURE  NDI  40 % impaired  STEADI  5 /14    HOME LIVING:  Lives with  in 1 story with a basement    PRIOR LEVEL OF FUNCTION  Same symptoms but not as often or severe    OBJECTIVE:      CERVCIAL   ROM:           MMT  Flexion  33  4/5  Extension  10  4/5  Side bend     R:    15      4/5  L :  17      4/5  Rotation     R:    25        L :  25             MMT:                                  R                  L     Shoulder flexion                  5/5                 5/5  Shoulder abduction            5/5               5/5      SPECIAL TESTS:  Compression \"feels good\"  Distraction Reduced pain  Spurlings:        R    ( - )               L  (  -)  with both pull and not " pain  Vertebral artery test     unable to test due to lack of ROM      POSTURE:  Forward head -1.5 inches  Shoulder:   Protraction ( B )  Elevation  (  B )    Occular motor   Saccades  H (-)      V (-)  Smooth pursuit   H (+)       V (+)   for blurred vision  Slow VOR    H (-)       V (+)   for blurred vision      TREATMENTS:  Manual Therapy:  15 minutes  STM to B UT/cervical in sitting  Seated gentle manual cervical distraction    Education:  Pt educated in:  + current status, general goals, treatment plan  + gentle neck ROM in all planes (needs pictures)  + pain/spasm cycle  + assessment    ASSESSMENT  Pt is a 69 y.o. Female who presents with impairments of neck and head  pain,  reduced strength, reduced ROM/flexibility, light headedness, and soft tissue restriction  These impairments have led to functional limitations including  difficulty with lifting, concentration, ballance/dizziness, driving, reading and recreation    Complexity level:  moderate    Rehab potential:  good    Pt would benefit from skilled physical therapy intervention to improve above impairments and facilitate return to function.    GOALS:  Pt stated goal:  More movement in her neck and loosen muscles in neck and shoudlers    Long term goals:  +   Reduce pain to < 3 /10  +  Increase cervical ROM by 20 degrees in all planes of motion as needed for ADL's and driving  + Improve strength in scapular stabilizers as needed for improved posture  +  the pt will be independent in self posture correction to place reduced stress on the cervcial spine  + Reduce soft tissue restriction for reduced pain  +  Abolish headaches  + The pt will be independent in a home exercise program for effective self-management of symptoms  + abolish episodes of light headed/dizziness  + assess balance and achieve low fall risk         PLAN     Skilled physical therapy 2 times per week for 6-8 weeks  Treatment may include:  Therapeutic Exercise (14728)  Therapeutic Activity  (97777)  Manual therapy (02207)  Neuro-muscular re-education (08543)        The pt agreed with above stated treatment plan and general goals.                  Current Participants as of 9/6/2024    Name Type Comments Contact Info    Chacha Maloney DO PCP - General  [phone]    Signature pending    Margaret Duggan, PT Physical Therapist  [phone]

## 2024-09-09 ENCOUNTER — TREATMENT (OUTPATIENT)
Dept: PHYSICAL THERAPY | Facility: CLINIC | Age: 69
End: 2024-09-09
Payer: MEDICARE

## 2024-09-09 DIAGNOSIS — M54.2 CERVICALGIA: ICD-10-CM

## 2024-09-09 PROCEDURE — 97140 MANUAL THERAPY 1/> REGIONS: CPT | Mod: GP,CQ

## 2024-09-09 PROCEDURE — 97010 HOT OR COLD PACKS THERAPY: CPT | Mod: GP,CQ

## 2024-09-09 PROCEDURE — 97110 THERAPEUTIC EXERCISES: CPT | Mod: GP,CQ

## 2024-09-09 NOTE — PROGRESS NOTES
Physical Therapy  Treatment    Patient Name: Florina Miramontes  MRN: [id number]  Evaluation Date: 9/9/2024  Time Calculation  Start Time: 1100  Stop Time: 1140  Time Calculation (min): 40 min    INSURANCE:  Visit Number: 2  Approved Visits: [place]  Insurance Type: MEDICARE A/B - NO AUTH / [place] / $0 used 2024 PT/ST.  [facility] SUPP ACTIVE     CMS Re-Certification Period:9/6/24 to 12/7/24    CURRENT PROBLEMS:   1. Cervicalgia  Referral to Physical Therapy    Follow Up In Physical Therapy          PRECAUTIONS:    PMH: (pertinent to PT evaluation)  CA, Hearing loss, osteopenia,  HA, chronic fatigue    *See [facility] EMR for complete list.      SUBJECTIVE:B Cervical /UT discomfort persist L > R into upper back area      PRIOR:  The pt gets HA and then light headed and feels like she could fall though she hasn't. (2-3 years but getting more often and more severe).   She also has come neck pain and poor mobility to the pont it locks.  The neck has been happening for years   She also has ear pressure on both sides.      She has had testing and will see a neurologist soon.  Dx with arthritis,  and disc involvement.  She gets wavy vision    No radicular symptoms.    PAIN:    Current  0 /10 pressure  RANGE: 0 /10  to  7  /10 quick episode  Location: entire head  Aggravating: looking down in car and then getting out of the car.  Neck is worse at night   Reducing: nothing - it happens quickly.  If bad lays down and extea strength Tylenol.   Sleep:  wakes up often - not due to pain    OUTCOME MEASURE  NDI  40 % impaired  [facility]  [facility]    HOME LIVING:  Lives with  in 1 story with a basement    OBJECTIVE    TREATMENTS:  THERA EX : x 10 min    Over the door pulleys   UE bike x 2 min  Supine wand : shld flex/ add-abd/ bench press  CABLE cross ROWS #3WT 1 X 10  Lat pull downs # 3 wt 1 x 10      Manual Therapy:  20 minutes  STM to B UT/cervical / thoracic paraspinals in sitting  Supine  gentle manual cervical distraction/ ROM  stretch    Supine: HP to LS and cervical area x 10 min    Education:  Pt educated in:  + current status, general goals, treatment plan  + gentle neck ROM in all planes (needs pictures)  + pain/spasm cycle  + assessment    ASSESSMENT: Tightness noted L > R cervical , good release noted with manual supine stretch, cont wit5h ex /strengthening as trever      PRIOR:  Pt is a 69 y.o. Female who presents with impairments of neck and head  pain,  reduced strength, reduced ROM/flexibility, light headedness, and soft tissue restriction  These impairments have led to functional limitations including  difficulty with lifting, concentration, ballance/dizziness, driving, reading and recreation    Complexity level:  moderate    Rehab potential:  good    Pt would benefit from skilled physical therapy intervention to improve above impairments and facilitate return to function.    GOALS:  Pt stated goal:  More movement in her neck and loosen muscles in neck and shoudlers    Long term goals:  +   Reduce pain to < 3 /10  +  Increase cervical ROM by 20 degrees in all planes of motion as needed for ADL's and driving  + Improve strength in scapular stabilizers as needed for improved posture  +  the pt will be independent in self posture correction to place reduced stress on the cervcial spine  + Reduce soft tissue restriction for reduced pain  +  Abolish headaches  + The pt will be independent in a home exercise program for effective self-management of symptoms  + abolish episodes of light headed/dizziness  + assess balance and achieve low fall risk         PLAN     Skilled physical therapy 2 times per week for 6-8 weeks  Treatment may include:  Therapeutic Exercise (09672)  Therapeutic Activity (77821)  Manual therapy (61602)  Neuro-muscular re-education (32416)        The pt agreed with above stated treatment plan and general goals.

## 2024-09-11 ENCOUNTER — TREATMENT (OUTPATIENT)
Dept: PHYSICAL THERAPY | Facility: CLINIC | Age: 69
End: 2024-09-11
Payer: MEDICARE

## 2024-09-11 DIAGNOSIS — M54.2 CERVICALGIA: ICD-10-CM

## 2024-09-11 PROCEDURE — 97140 MANUAL THERAPY 1/> REGIONS: CPT | Mod: GP,CQ

## 2024-09-11 PROCEDURE — 97110 THERAPEUTIC EXERCISES: CPT | Mod: GP,CQ

## 2024-09-11 NOTE — PROGRESS NOTES
Physical Therapy  Treatment    Patient Name: Florina Miramontes  MRN: [id number]  Evaluation Date: 9/11/2024  Time Calculation  Start Time: 0245  Stop Time: 0315  Time Calculation (min): 30 min    INSURANCE:  Visit Number: 3  [place]ved Visits: [place]  Insurance Type: MEDICARE A/B - NO AUTH / [place] / $0 used 2024 PT/ST.  [facility] SUPP ACTIVE     CMS Re-Certification Period:9/6/24 to 12/7/24    CURRENT PROBLEMS:   1. Cervicalgia  Referral to Physical Therapy    Follow Up In Physical Therapy          PRECAUTIONS:    PMH: (pertinent to PT evaluation)  CA, Hearing loss, osteopenia,  HA, chronic fatigue    *See [facility] EMR for complete list.      SUBJECTIVE: felt good following last session ,      PRIOR:  The pt gets HA and then light headed and feels like she could fall though she hasn't. (2-3 years but getting more often and more severe).   She also has come neck pain and poor mobility to the pont it locks.  The neck has been happening for years   She also has ear pressure on both sides.      She has had testing and will see a neurologist soon.  Dx with arthritis,  and disc involvement.  She gets wavy vision    No radicular symptoms.    PAIN:    Current  0 /10 pressure  RANGE: 0 /10  to  7  /10 quick episode  Location: entire head  Aggravating: looking down in car and then getting out of the car.  Neck is worse at night   Reducing: nothing - it happens quickly.  If bad lays down and extea strength Tylenol.   Sleep:  wakes up often - not due to pain    OUTCOME MEASURE  NDI  40 % impaired  STEADI  5 /14    OBJECTIVE    TREATMENTS:  THERA EX : x 10 min    Over the door pulleys   UE bike x 2 min  Supine wand : shld flex/ add-abd/ bench press    CABLE cross ROWS #3WT 1 X 10  Lat pull downs # 3 wt 1 x 10      Manual Therapy:  20 minutes  STM to B UT/cervical / thoracic paraspinals in sitting  Supine  gentle manual cervical distraction/ ROM stretch        Education:  Pt educated in:  + current status, general goals, treatment plan  +  gentle neck ROM in all planes (needs pictures)  + pain/spasm cycle  + assessment    ASSESSMENT: Tightness noted L > R cervical , good release noted with manual supine stretch, cont with ex /strengthening as trever      PRIOR:  Pt is a 69 y.o. Female who presents with impairments of neck and head  pain,  reduced strength, reduced ROM/flexibility, light headedness, and soft tissue restriction  These impairments have led to functional limitations including  difficulty with lifting, concentration, ballance/dizziness, driving, reading and recreation    Complexity level:  moderate    Rehab potential:  good    Pt would benefit from skilled physical therapy intervention to improve above impairments and facilitate return to function.    GOALS:  Pt stated goal:  More movement in her neck and loosen muscles in neck and shoudlers    Long term goals:  +   Reduce pain to < 3 /10  +  Increase cervical ROM by 20 degrees in all planes of motion as needed for ADL's and driving  + Improve strength in scapular stabilizers as needed for improved posture  +  the pt will be independent in self posture correction to place reduced stress on the cervcial spine  + Reduce soft tissue restriction for reduced pain  +  Abolish headaches  + The pt will be independent in a home exercise program for effective self-management of symptoms  + abolish episodes of light headed/dizziness  + assess balance and achieve low fall risk         PLAN     Skilled physical therapy 2 times per week for 6-8 weeks  Treatment may include:  Therapeutic Exercise (68174)  Therapeutic Activity (73841)  Manual therapy (35520)  Neuro-muscular re-education (27746)        The pt agreed with above stated treatment plan and general goals.

## 2024-09-16 ENCOUNTER — TREATMENT (OUTPATIENT)
Dept: PHYSICAL THERAPY | Facility: CLINIC | Age: 69
End: 2024-09-16
Payer: MEDICARE

## 2024-09-16 DIAGNOSIS — M54.2 CERVICALGIA: ICD-10-CM

## 2024-09-16 PROCEDURE — 97140 MANUAL THERAPY 1/> REGIONS: CPT | Mod: GP | Performed by: PHYSICAL THERAPIST

## 2024-09-16 PROCEDURE — 97110 THERAPEUTIC EXERCISES: CPT | Mod: GP | Performed by: PHYSICAL THERAPIST

## 2024-09-16 NOTE — PROGRESS NOTES
Physical Therapy  Treatment    Patient Name: Florina Miramontes  MRN: [id number]  Evaluation Date: 9/16/2024  Time Calculation  Start Time: 1300  Stop Time: 1345  Time Calculation (min): 45 min    INSURANCE:  Visit Number: 4  Approved Visits: [place]  Insurance Type: MEDICARE A/B - NO AUTH / [place] / $0 used 2024 PT/ST.  [facility] SUPP ACTIVE     CMS Re-Certification Period:9/6/24 to 12/7/24    CURRENT PROBLEMS:   1. Cervicalgia  Referral to Physical Therapy    Follow Up In Physical Therapy          PRECAUTIONS:    PMH: (pertinent to PT evaluation)  CA, Hearing loss, osteopenia,  HA, chronic fatigue    *See [facility] EMR for complete list.      SUBJECTIVE:   She saw the neurologist last week who reccommended Therapy.  Her HA have been better overall.  She feels the tightness creeping back since therapy.      PRIOR:  The pt gets HA and then light headed and feels like she could fall though she hasn't. (2-3 years but getting more often and more severe).   She also has come neck pain and poor mobility to the pont it locks.  The neck has been happening for years   She also has ear pressure on both sides.      She has had testing and will see a neurologist soon.  Dx with arthritis,  and disc involvement.  She gets wavy vision    No radicular symptoms.    PAIN:    Current  0 /10 pressure  RANGE: 0 /10  to  7  /10 quick episode    OUTCOME MEASURE  NDI  40 % impaired  STEADI  5 /14    OBJECTIVE  TREATMENTS:  Therapeutic exercise: x15 minutes    Over the door pulleys  2 minutes   UE bike x 2 minutes forward and backwards  Supine wand : shld flex/ add-abd/ bench press/ biceps curl x~20 each  Seated rows with BTB x12    ===========DNP equipment not available =================  CABLE cross ROWS #3WT 1 X 10  Lat pull downs # 3 wt 1 x 10  ================================================    Manual Therapy:  25 minutes  STM to B UT/cervical / thoracic paraspinals in sitting  seated  gentle manual cervical distraction    Education:  Discussed  realistic progression     PRIOR  Pt educated in:  + current status, general goals, treatment plan  + gentle neck ROM in all planes (needs pictures)  + pain/spasm cycle  + assessment    ASSESSMENT:   Continued tightness, but able to progress exercises.  She is showing progress with periods of less pain after therapy.  Recurrence of pain as time elapses after therapy shows need for further therapy.      Pt would benefit from skilled physical therapy intervention to improve above impairments and facilitate return to function.    PRIOR:  Pt is a 69 y.o. Female who presents with impairments of neck and head  pain,  reduced strength, reduced ROM/flexibility, light headedness, and soft tissue restriction  These impairments have led to functional limitations including  difficulty with lifting, concentration, ballance/dizziness, driving, reading and recreation      GOALS:  Pt stated goal:  More movement in her neck and loosen muscles in neck and shoudlers    Long term goals:  +   Reduce pain to < 3 /10  +  Increase cervical ROM by 20 degrees in all planes of motion as needed for ADL's and driving  + Improve strength in scapular stabilizers as needed for improved posture  +  the pt will be independent in self posture correction to place reduced stress on the cervcial spine  + Reduce soft tissue restriction for reduced pain  +  Abolish headaches  + The pt will be independent in a home exercise program for effective self-management of symptoms  + abolish episodes of light headed/dizziness  + assess balance and achieve low fall risk         PLAN     Skilled physical therapy 2 times per week for 6-8 weeks  Treatment may include:  Therapeutic Exercise (39340)  Therapeutic Activity (95657)  Manual therapy (41908)  Neuro-muscular re-education (07760)        The pt agreed with above stated treatment plan and general goals.

## 2024-09-19 ENCOUNTER — TREATMENT (OUTPATIENT)
Dept: PHYSICAL THERAPY | Facility: CLINIC | Age: 69
End: 2024-09-19
Payer: MEDICARE

## 2024-09-19 DIAGNOSIS — M54.2 CERVICALGIA: ICD-10-CM

## 2024-09-19 PROCEDURE — 97110 THERAPEUTIC EXERCISES: CPT | Mod: GP | Performed by: PHYSICAL THERAPIST

## 2024-09-19 PROCEDURE — 97140 MANUAL THERAPY 1/> REGIONS: CPT | Mod: GP | Performed by: PHYSICAL THERAPIST

## 2024-09-19 NOTE — PROGRESS NOTES
Physical Therapy  Treatment    Patient Name: Florina Miramontes  MRN: [id number]  Evaluation Date: 9/19/2024  Time Calculation  Start Time: 1125  Stop Time: 1210  Time Calculation (min): 45 min    INSURANCE:  Visit Number: 5  Approved Visits: [place]  Insurance Type: MEDICARE A/B - NO AUTH / [place] / $0 used 2024 PT/ST.  [facility]     CMS Re-Certification Period:9/6/24 to 12/7/24    CURRENT PROBLEMS:   1. Cervicalgia  Referral to Physical Therapy    Follow Up In Physical Therapy          PRECAUTIONS:    PMH: (pertinent to PT evaluation)  CA, Hearing loss, osteopenia,  HA, chronic fatigue    *See [facility] EMR for complete list.      SUBJECTIVE:   She states that she has less pressure in her head.  She is outside more with grandchildren's sports, which seems to both either allergies more      PRIOR:  The pt gets HA and then light headed and feels like she could fall though she hasn't. (2-3 years but getting more often and more severe).   She also has come neck pain and poor mobility to the pont it locks.  The neck has been happening for years   She also has ear pressure on both sides.      She has had testing and will see a neurologist soon.  Dx with arthritis,  and disc involvement.  She gets wavy vision    No radicular symptoms.    PAIN:    Current  0 /10 pressure  RANGE: 0 /10  to  7  /10 quick episode    OUTCOME MEASURE  NDI  40 % impaired  STEADI  5 /14    OBJECTIVE  TREATMENTS:  Therapeutic exercise: x20 minutes    Over the door pulleys  2 minutes   UE bike x 2 minutes forward and backwards  Supine wand : shld flex/ add-abd/ bench press/ biceps curl x~20 each    CABLE cross ROWS #3WT 1 X 10  Lat pull downs # 3 wt 1 x 15      Manual Therapy:  25 minutes  STM to B UT/cervical / thoracic paraspinals in sitting  seated  gentle manual cervical distraction    Education:  +Occasional frequent stretching  +Unwinding the pain/spasm cycle  +progress  + can use a cervical soft collar for support as needed    PRIOR  Pt  educated in:  + current status, general goals, treatment plan  + gentle neck ROM in all planes (needs pictures)  + pain/spasm cycle  + assessment  + Discussed realistic progression     ASSESSMENT:   Pt able to progress exercises with improved ROM noted with exercise.  Tightness greater on the L than R especially near the occiput.  Good tolerance of treatment with no increased pain      Pt would benefit from skilled physical therapy intervention to improve above impairments and facilitate return to function.    PRIOR/Evaluation:  Pt is a 69 y.o. Female who presents with impairments of neck and head  pain,  reduced strength, reduced ROM/flexibility, light headedness, and soft tissue restriction  These impairments have led to functional limitations including  difficulty with lifting, concentration, ballance/dizziness, driving, reading and recreation      GOALS:  Pt stated goal:  More movement in her neck and loosen muscles in neck and shoudlers    Long term goals:  +   Reduce pain to < 3 /10  +  Increase cervical ROM by 20 degrees in all planes of motion as needed for ADL's and driving  + Improve strength in scapular stabilizers as needed for improved posture  +  the pt will be independent in self posture correction to place reduced stress on the cervcial spine  + Reduce soft tissue restriction for reduced pain  +  Abolish headaches  + The pt will be independent in a home exercise program for effective self-management of symptoms  + abolish episodes of light headed/dizziness  + assess balance and achieve low fall risk         PLAN     Skilled physical therapy 2 times per week for 6-8 weeks  Treatment may include:  Therapeutic Exercise (30474)  Therapeutic Activity (14516)  Manual therapy (86904)  Neuro-muscular re-education (93741)        The pt agreed with above stated treatment plan and general goals.

## 2024-09-24 ENCOUNTER — TREATMENT (OUTPATIENT)
Dept: PHYSICAL THERAPY | Facility: CLINIC | Age: 69
End: 2024-09-24
Payer: MEDICARE

## 2024-09-24 DIAGNOSIS — M54.2 CERVICALGIA: ICD-10-CM

## 2024-09-24 PROCEDURE — 97140 MANUAL THERAPY 1/> REGIONS: CPT | Mod: GP,CQ

## 2024-09-24 PROCEDURE — 97110 THERAPEUTIC EXERCISES: CPT | Mod: GP,CQ

## 2024-09-24 NOTE — PROGRESS NOTES
Physical Therapy  Treatment    Patient Name: Florina Miramontes  MRN: [id number]  Evaluation Date: 9/24/2024  Time Calculation  Start Time: 1100  Stop Time: 1140  Time Calculation (min): 40 min    INSURANCE:  Visit Number: 6  Approved Visits: [place]  Insurance Type: MEDICARE A/B - NO AUTH / [place] / $0 used 2024 PT[facility]ST.  [facility]     CMS Re-Certification Period:9/6/24 to 12/7/24    CURRENT PROBLEMS:   1. Cervicalgia  Referral to Physical Therapy    Follow Up In Physical Therapy          PRECAUTIONS:    PMH: (pertinent to PT evaluation)  CA, Hearing loss, osteopenia,  HA, chronic fatigue    *See [facility] EMR for complete list.      SUBJECTIVE:   She states that she has less pressure in her head.  She is outside more with grandchildren's sports, experienced slight headache as rain began however quickly resolved , decreased frequency of headaches over all      PRIOR:  The pt gets HA and then light headed and feels like she could fall though she hasn't. (2-3 years but getting more often and more severe).   She also has come neck pain and poor mobility to the pont it locks.  The neck has been happening for years   She also has ear pressure on both sides.      She has had testing and will see a neurologist soon.  Dx with arthritis,  and disc involvement.  She gets wavy vision    No radicular symptoms.    PAIN:    Current  0 /10 pressure  RANGE: 0 /10  to  7  /10 quick episode    OUTCOME MEASURE  NDI  40 % impaired  STEADI  5 /14    OBJECTIVE  TREATMENTS:  Therapeutic exercise: x 15  minutes    Over the door pulleys  2 minutes   UE bike x 2 minutes forward and backwards  Supine wand : shld flex/ add-abd/ bench press/ biceps curl x~20 each    CABLE cross ROWS #3WT 2 X 10 OTB pull aparts in between  Lat pull downs # 3 wt 1 x 15      Manual Therapy:  25 minutes  STM to B UT/cervical / thoracic paraspinals in sitting  seated  gentle manual cervical distraction    Education:  +Occasional frequent stretching  +Unwinding the  pain/spasm cycle  +progress  + can use a cervical soft collar for support as needed    PRIOR  Pt educated in:  + current status, general goals, treatment plan  + gentle neck ROM in all planes (needs pictures)  + pain/spasm cycle  + assessment  + Discussed realistic progression     ASSESSMENT:   Pt able to progress exercises with improved ROM noted with exercise.  Tightness greater on the L than R especially near the occiput.   Relief of tightness/ tension  following session completion      Pt would benefit from skilled physical therapy intervention to improve above impairments and facilitate return to function.    PRIOR/Evaluation:  Pt is a 69 y.o. Female who presents with impairments of neck and head  pain,  reduced strength, reduced ROM/flexibility, light headedness, and soft tissue restriction  These impairments have led to functional limitations including  difficulty with lifting, concentration, ballance/dizziness, driving, reading and recreation      GOALS:  Pt stated goal:  More movement in her neck and loosen muscles in neck and shoudlers    Long term goals:  +   Reduce pain to < 3 /10  +  Increase cervical ROM by 20 degrees in all planes of motion as needed for ADL's and driving  + Improve strength in scapular stabilizers as needed for improved posture  +  the pt will be independent in self posture correction to place reduced stress on the cervcial spine  + Reduce soft tissue restriction for reduced pain  +  Abolish headaches  + The pt will be independent in a home exercise program for effective self-management of symptoms  + abolish episodes of light headed/dizziness  + assess balance and achieve low fall risk         PLAN     Skilled physical therapy 2 times per week for 6-8 weeks  Treatment may include:  Therapeutic Exercise (14456)  Therapeutic Activity (36216)  Manual therapy (52364)  Neuro-muscular re-education (56609)        The pt agreed with above stated treatment plan and general goals.

## 2024-09-26 ENCOUNTER — TREATMENT (OUTPATIENT)
Dept: PHYSICAL THERAPY | Facility: CLINIC | Age: 69
End: 2024-09-26
Payer: MEDICARE

## 2024-09-26 DIAGNOSIS — M54.2 CERVICALGIA: ICD-10-CM

## 2024-09-26 PROCEDURE — 97140 MANUAL THERAPY 1/> REGIONS: CPT | Mod: GP | Performed by: PHYSICAL THERAPIST

## 2024-09-26 PROCEDURE — 97110 THERAPEUTIC EXERCISES: CPT | Mod: GP | Performed by: PHYSICAL THERAPIST

## 2024-09-26 NOTE — PROGRESS NOTES
Physical Therapy  Treatment    Patient Name: Florina Miramontes  MRN: [id number]  Evaluation Date: 9/26/2024  Time Calculation  Start Time: 1300  Stop Time: 1345  Time Calculation (min): 45 min    INSURANCE:  Visit Number: 7  Approved Visits: [place]  Insurance Type: MEDICARE A/B - NO AUTH / [place] / $0 used 2024 PT/STCary HAYES  SUPP ACTIVE     CMS Re-Certification Period:9/6/24 to 12/7/24    CURRENT PROBLEMS:   1. Cervicalgia  Referral to Physical Therapy    Follow Up In Physical Therapy          PRECAUTIONS:  None    PMH: (pertinent to PT evaluation)  CA, Hearing loss, osteopenia,  HA, chronic fatigue    *See [facility] EMR for complete list.      SUBJECTIVE:   She states She has not had HA in 4-5 days.  It is more that she feels locked out of movement.    She saw a neurologist who recommended continued PT. She wanted to lower heart rate and dilate vessels in the head to help with the HA.  She is monitoring her BP.  She also recommended 3 MRI's.    She notices when driving, that she can start turning her head more.    PRIOR:  The pt gets HA and then light headed and feels like she could fall though she hasn't. (2-3 years but getting more often and more severe).   She also has come neck pain and poor mobility to the pont it locks.  The neck has been happening for years   She also has ear pressure on both sides.      She has had testing and will see a neurologist soon.  Dx with arthritis,  and disc involvement.  She gets wavy vision    No radicular symptoms.    PAIN:    Current  3/10 with movement      OBJECTIVE  TREATMENTS:  Therapeutic exercise: x 20  minutes  Over the door pulleys  2 minutes  UE bike x 2 minutes forward and backwards  Supine wand : shld flex/ add-abd/ bench press/ biceps curl x~20 each    CABLE cross ROWS #3WT 2 X 10 OTB pull aparts in between  Lat pull downs # 3 wt 1 x 20      Manual Therapy:  25 minutes  STM to B UT/cervical in sitting  supine occipital release      Education:  +Occasional frequent  stretching  +Unwinding the pain/spasm cycle  +progress  + can use a cervical soft collar for support as needed    PRIOR  Pt educated in:  + current status, general goals, treatment plan  + gentle neck ROM in all planes (needs pictures)  + pain/spasm cycle  + assessment  + Discussed realistic progression     ASSESSMENT:   Pt able to progress exercises with improved ROM noted with exercise and in function.  Able to achieve greater release with manual.  HA are less frequent.    Pt has muscle fatigue at the end of the session    Pt would benefit from skilled physical therapy intervention to improve above impairments and facilitate return to function.    PRIOR/Evaluation:  Pt is a 69 y.o. Female who presents with impairments of neck and head  pain,  reduced strength, reduced ROM/flexibility, light headedness, and soft tissue restriction  These impairments have led to functional limitations including  difficulty with lifting, concentration, ballance/dizziness, driving, reading and recreation      GOALS:  Pt stated goal:  More movement in her neck and loosen muscles in neck and shoudlers    Long term goals:  +   Reduce pain to < 3 /10  +  Increase cervical ROM by 20 degrees in all planes of motion as needed for ADL's and driving  + Improve strength in scapular stabilizers as needed for improved posture  +  the pt will be independent in self posture correction to place reduced stress on the cervcial spine  + Reduce soft tissue restriction for reduced pain  +  Abolish headaches  + The pt will be independent in a home exercise program for effective self-management of symptoms  + abolish episodes of light headed/dizziness  + assess balance and achieve low fall risk         PLAN     Skilled physical therapy 2 times per week for 6-8 weeks  Treatment may include:  Therapeutic Exercise (28648)  Therapeutic Activity (41947)  Manual therapy (95511)  Neuro-muscular re-education (58102)        The pt agreed with above stated treatment  plan and general goals.

## 2024-10-01 ENCOUNTER — APPOINTMENT (OUTPATIENT)
Dept: PHYSICAL THERAPY | Facility: CLINIC | Age: 69
End: 2024-10-01
Payer: MEDICARE

## 2024-10-03 ENCOUNTER — TREATMENT (OUTPATIENT)
Dept: PHYSICAL THERAPY | Facility: CLINIC | Age: 69
End: 2024-10-03
Payer: MEDICARE

## 2024-10-03 DIAGNOSIS — M54.2 CERVICALGIA: ICD-10-CM

## 2024-10-03 PROCEDURE — 97140 MANUAL THERAPY 1/> REGIONS: CPT | Mod: GP,CQ

## 2024-10-03 PROCEDURE — 97110 THERAPEUTIC EXERCISES: CPT | Mod: GP,CQ

## 2024-10-03 NOTE — PROGRESS NOTES
Physical Therapy  Treatment    Patient Name: Florina Miramontes  MRN: [id number]  Evaluation Date: 10/3/2024  Time Calculation  Start Time: 1140  Stop Time: 1220  Time Calculation (min): 40 min    INSURANCE:  Visit Number: 8  Approved Visits: [place]  Insurance Type: MEDICARE A/B - NO AUTH / [place] / $0 used 2024 PT/ST.  [facility]  SUPP ACTIVE     CMS Re-Certification Period:9/6/24 to 12/7/24    CURRENT PROBLEMS:   1. Cervicalgia  Referral to Physical Therapy    Follow Up In Physical Therapy          PRECAUTIONS:  None    PMH: (pertinent to PT evaluation)  CA, Hearing loss, osteopenia,  HA, chronic fatigue    *See [facility] EMR for complete list.      SUBJECTIVE: Decreased headaches ,however tightness persist B upper cervical area        PRIOR:  She states She has not had HA in 4-5 days.  It is more that she feels locked out of movement.    She saw a neurologist who recommended continued PT. She wanted to lower heart rate and dilate vessels in the head to help with the HA.  She is monitoring her BP.  She also recommended 3 MRI's.    She notices when driving, that she can start turning her head more.    PRIOR:  The pt gets HA and then light headed and feels like she could fall though she hasn't. (2-3 years but getting more often and more severe).   She also has come neck pain and poor mobility to the pont it locks.  The neck has been happening for years   She also has ear pressure on both sides.      She has had testing and will see a neurologist soon.  Dx with arthritis,  and disc involvement.  She gets wavy vision    No radicular symptoms.    PAIN:    Current  3/10 with movement      OBJECTIVE  TREATMENTS:  Therapeutic exercise: x 20  minutes  Over the door pulleys  2 minutes  UE bike x 2 minutes forward and backwards  Supine wand : shld flex/ add-abd/ bench press/ biceps curl x~20 each    CABLE cross ROWS #3WT 2 X 10 OTB pull aparts in between  Lat pull downs # 3 wt 1 x 20      Manual Therapy:  20 minutes  STM to B UT/cervical  in sitting  supine occipital release      Education:  +Occasional frequent stretching  +Unwinding the pain/spasm cycle  +progress  + can use a cervical soft collar for support as needed    PRIOR  Pt educated in:  + current status, general goals, treatment plan  + gentle neck ROM in all planes (needs pictures)  + pain/spasm cycle  + assessment  + Discussed realistic progression     ASSESSMENT: Performs all ex well, tightness persist however has decreased discomfort remains 3/10      Pt able to progress exercises with improved ROM noted with exercise and in function.  Able to achieve greater release with manual.  HA are less frequent.    Pt has muscle fatigue at the end of the session    Pt would benefit from skilled physical therapy intervention to improve above impairments and facilitate return to function.    PRIOR/Evaluation:  Pt is a 69 y.o. Female who presents with impairments of neck and head  pain,  reduced strength, reduced ROM/flexibility, light headedness, and soft tissue restriction  These impairments have led to functional limitations including  difficulty with lifting, concentration, ballance/dizziness, driving, reading and recreation      GOALS:  Pt stated goal:  More movement in her neck and loosen muscles in neck and shoudlers    Long term goals:  +   Reduce pain to < 3 /10  +  Increase cervical ROM by 20 degrees in all planes of motion as needed for ADL's and driving  + Improve strength in scapular stabilizers as needed for improved posture  +  the pt will be independent in self posture correction to place reduced stress on the cervcial spine  + Reduce soft tissue restriction for reduced pain  +  Abolish headaches  + The pt will be independent in a home exercise program for effective self-management of symptoms  + abolish episodes of light headed/dizziness  + assess balance and achieve low fall risk         PLAN     Skilled physical therapy 2 times per week for 6-8 weeks  Treatment may  include:  Therapeutic Exercise (08068)  Therapeutic Activity (08856)  Manual therapy (59811)  Neuro-muscular re-education (33456)        The pt agreed with above stated treatment plan and general goals.

## 2025-02-10 ENCOUNTER — DOCUMENTATION (OUTPATIENT)
Dept: PHYSICAL THERAPY | Facility: CLINIC | Age: 70
End: 2025-02-10
Payer: MEDICARE

## 2025-02-10 NOTE — PROGRESS NOTES
Physical Therapy    Discharge Summary        Discharge Information:   Discahrge date: 2/10/25  Date of last treatment: 10/3/24  Date of evaluation: 9/6/24  Number of sessions 8    Therapy Summary:   No formal re-assessment due to unexpected discharge.  See note on last attended treatment for status    Discharge Status:   Goals were partially met based on subjective reports and performance in therapy.    Rehab Discharge Reason:   Self discharge. The pt did not return for further therapy.    Discharge from PT at this time